# Patient Record
Sex: FEMALE | Race: WHITE | URBAN - METROPOLITAN AREA
[De-identification: names, ages, dates, MRNs, and addresses within clinical notes are randomized per-mention and may not be internally consistent; named-entity substitution may affect disease eponyms.]

---

## 2022-11-10 ENCOUNTER — OFFICE VISIT (OUTPATIENT)
Dept: URBAN - METROPOLITAN AREA CLINIC 64 | Facility: CLINIC | Age: 67
End: 2022-11-10
Payer: MEDICARE

## 2022-11-10 DIAGNOSIS — H25.13 AGE-RELATED NUCLEAR CATARACT, BILATERAL: Primary | ICD-10-CM

## 2022-11-10 DIAGNOSIS — H52.03 HYPERMETROPIA, BILATERAL: ICD-10-CM

## 2022-11-10 PROCEDURE — 99203 OFFICE O/P NEW LOW 30 MIN: CPT

## 2022-11-10 ASSESSMENT — VISUAL ACUITY
OD: 20/25
OS: 20/20

## 2022-11-10 ASSESSMENT — KERATOMETRY
OD: 44.31
OS: 43.86

## 2022-11-10 ASSESSMENT — INTRAOCULAR PRESSURE
OS: 13
OD: 12

## 2022-11-10 NOTE — IMPRESSION/PLAN
Impression: Age-related nuclear cataract, bilateral: H25.13. Plan: Patient educated on condition. No significant decrease in vision and difficulty with activities of daily living. No surgery indicated at this time.  Monitor

## 2025-02-11 ENCOUNTER — OFFICE VISIT (OUTPATIENT)
Dept: URBAN - METROPOLITAN AREA CLINIC 64 | Facility: LOCATION | Age: 70
End: 2025-02-11
Payer: COMMERCIAL

## 2025-02-11 DIAGNOSIS — H43.812 VITREOUS DEGENERATION, LEFT EYE: ICD-10-CM

## 2025-02-11 DIAGNOSIS — H25.813 COMBINED FORMS OF AGE-RELATED CATARACT, BILATERAL: Primary | ICD-10-CM

## 2025-02-11 DIAGNOSIS — H52.03 HYPERMETROPIA, BILATERAL: ICD-10-CM

## 2025-02-11 DIAGNOSIS — H11.151 PINGUECULA, RIGHT EYE: ICD-10-CM

## 2025-02-11 PROCEDURE — 92015 DETERMINE REFRACTIVE STATE: CPT

## 2025-02-11 PROCEDURE — 99214 OFFICE O/P EST MOD 30 MIN: CPT

## 2025-02-11 ASSESSMENT — VISUAL ACUITY
OD: 20/25
OS: 20/20

## 2025-02-11 ASSESSMENT — INTRAOCULAR PRESSURE
OS: 18
OD: 17

## 2025-02-11 ASSESSMENT — KERATOMETRY
OS: 43.95
OD: 43.69

## 2025-02-12 ENCOUNTER — OFFICE VISIT (OUTPATIENT)
Dept: URBAN - METROPOLITAN AREA CLINIC 64 | Facility: LOCATION | Age: 70
End: 2025-02-12
Payer: COMMERCIAL

## 2025-02-12 DIAGNOSIS — H25.13 AGE-RELATED NUCLEAR CATARACT, BILATERAL: Primary | ICD-10-CM

## 2025-02-12 PROCEDURE — 99212 OFFICE O/P EST SF 10 MIN: CPT

## 2025-02-12 ASSESSMENT — VISUAL ACUITY
OS: 20/20
OD: 20/25